# Patient Record
Sex: FEMALE | Race: WHITE | URBAN - METROPOLITAN AREA
[De-identification: names, ages, dates, MRNs, and addresses within clinical notes are randomized per-mention and may not be internally consistent; named-entity substitution may affect disease eponyms.]

---

## 2020-10-11 ENCOUNTER — TELEPHONE ENCOUNTER (OUTPATIENT)
Dept: URBAN - METROPOLITAN AREA CLINIC 92 | Facility: CLINIC | Age: 25
End: 2020-10-11

## 2020-10-11 RX ORDER — DIVALPROEX SODIUM 125 MG
TABLET, DELAYED RELEASE (ENTERIC COATED) ORAL
Qty: 0 | Refills: 0 | Status: ACTIVE | COMMUNITY
Start: 1900-01-01 | End: 1900-01-01

## 2020-10-11 RX ORDER — RUFINAMIDE 400 MG/1
TABLET, FILM COATED ORAL
Qty: 0 | Refills: 0 | Status: ACTIVE | COMMUNITY
Start: 1900-01-01 | End: 1900-01-01

## 2020-10-11 RX ORDER — CANNABIDIOL 100 MG/ML
SOLUTION ORAL
Qty: 0 | Refills: 0 | Status: ACTIVE | COMMUNITY
Start: 1900-01-01 | End: 1900-01-01

## 2020-10-11 RX ORDER — FAMOTIDINE 40 MG/5ML
5ML FOR SUSPENSION ORAL ONCE A DAY
Qty: 450 ML | Refills: 0
Start: 2020-10-11

## 2020-10-11 RX ORDER — LEVOCARNITINE 330 MG/1
TABLET ORAL
Qty: 0 | Refills: 0 | Status: ACTIVE | COMMUNITY
Start: 1900-01-01 | End: 1900-01-01

## 2020-10-11 RX ORDER — FOLIC ACID 1 MG/1
TABLET ORAL
Qty: 0 | Refills: 0 | Status: ACTIVE | COMMUNITY
Start: 1900-01-01 | End: 1900-01-01

## 2020-11-11 ENCOUNTER — OFFICE VISIT (OUTPATIENT)
Dept: URBAN - METROPOLITAN AREA TELEHEALTH 2 | Facility: TELEHEALTH | Age: 25
End: 2020-11-11
Payer: COMMERCIAL

## 2020-11-11 DIAGNOSIS — Z99.3 WHEELCHAIR BOUND: ICD-10-CM

## 2020-11-11 DIAGNOSIS — K21.9 ACID REFLUX DISEASE: ICD-10-CM

## 2020-11-11 DIAGNOSIS — G40.813 LENNOX-GASTAUT SYNDROME, INTRACTABLE, WITH STATUS EPILEPTICUS: ICD-10-CM

## 2020-11-11 DIAGNOSIS — H47.619 CORTICAL BLINDNESS, UNSPECIFIED SIDE OF BRAIN: ICD-10-CM

## 2020-11-11 DIAGNOSIS — K59.01 CONSTIPATION: ICD-10-CM

## 2020-11-11 PROCEDURE — G9903 PT SCRN TBCO ID AS NON USER: HCPCS | Performed by: INTERNAL MEDICINE

## 2020-11-11 PROCEDURE — G8427 DOCREV CUR MEDS BY ELIG CLIN: HCPCS | Performed by: INTERNAL MEDICINE

## 2020-11-11 PROCEDURE — 99212 OFFICE O/P EST SF 10 MIN: CPT | Performed by: INTERNAL MEDICINE

## 2020-11-11 PROCEDURE — 1036F TOBACCO NON-USER: CPT | Performed by: INTERNAL MEDICINE

## 2020-11-11 PROCEDURE — G8420 CALC BMI NORM PARAMETERS: HCPCS | Performed by: INTERNAL MEDICINE

## 2020-11-11 RX ORDER — DIVALPROEX SODIUM 125 MG
TABLET, DELAYED RELEASE (ENTERIC COATED) ORAL
Qty: 0 | Refills: 0 | Status: ACTIVE | COMMUNITY
Start: 1900-01-01 | End: 1900-01-01

## 2020-11-11 RX ORDER — FOLIC ACID 1 MG/1
TABLET ORAL
Qty: 0 | Refills: 0 | Status: ACTIVE | COMMUNITY
Start: 1900-01-01 | End: 1900-01-01

## 2020-11-11 RX ORDER — FAMOTIDINE 40 MG/5ML
5ML FOR SUSPENSION ORAL ONCE A DAY
Qty: 450 ML | Refills: 0 | Status: ACTIVE | COMMUNITY
Start: 2020-10-11

## 2020-11-11 RX ORDER — RUFINAMIDE 400 MG/1
TABLET, FILM COATED ORAL
Qty: 0 | Refills: 0 | Status: ACTIVE | COMMUNITY
Start: 1900-01-01 | End: 1900-01-01

## 2020-11-11 RX ORDER — FAMOTIDINE 40 MG/5ML
5ML FOR SUSPENSION ORAL ONCE A DAY
Qty: 450 ML | Refills: 1

## 2020-11-11 RX ORDER — LEVOCARNITINE 330 MG/1
TABLET ORAL
Qty: 0 | Refills: 0 | Status: ACTIVE | COMMUNITY
Start: 1900-01-01 | End: 1900-01-01

## 2020-11-11 RX ORDER — CANNABIDIOL 100 MG/ML
SOLUTION ORAL
Qty: 0 | Refills: 0 | Status: ACTIVE | COMMUNITY
Start: 1900-01-01 | End: 1900-01-01

## 2020-11-11 NOTE — HPI-TODAY'S VISIT:
-she has started a day program  -she is doing the miralax on a daily basis which is helping.  she has had a change in her seizure medication which may have changed this . she states that she is working on doing this -she has had a great appetite- not using cyprohepatidine at all  -weight igoing up -good appetite -she is using the famotidine  on a daily basis -she does not currently need refills

## 2020-11-11 NOTE — HPI-OTHER HISTORIES
Patient was seen last in 2019. She has history of cerebral palsy and Lennox gastaut syndrome with cortical blindness. She is wheelchair bound. She has a history of bowel obstruction in 2016 and since that time has been on a regimen of MiraLAX as needed. She had previously been on cyproheptadine to help with her appetite. But at her last visit she actually had stopped the medication. She is on multiple anti-seizure medications.

## 2021-01-14 ENCOUNTER — TELEPHONE ENCOUNTER (OUTPATIENT)
Dept: URBAN - METROPOLITAN AREA CLINIC 23 | Facility: CLINIC | Age: 26
End: 2021-01-14

## 2021-08-09 ENCOUNTER — TELEPHONE ENCOUNTER (OUTPATIENT)
Dept: URBAN - METROPOLITAN AREA CLINIC 49 | Facility: CLINIC | Age: 26
End: 2021-08-09

## 2021-08-09 RX ORDER — FAMOTIDINE 40 MG/5ML
5ML FOR SUSPENSION ORAL ONCE A DAY
Qty: 450 ML | Refills: 1
Start: 2021-08-09

## 2021-09-16 ENCOUNTER — OFFICE VISIT (OUTPATIENT)
Dept: URBAN - METROPOLITAN AREA CLINIC 23 | Facility: CLINIC | Age: 26
End: 2021-09-16

## 2021-10-25 ENCOUNTER — WEB ENCOUNTER (OUTPATIENT)
Dept: URBAN - METROPOLITAN AREA CLINIC 23 | Facility: CLINIC | Age: 26
End: 2021-10-25

## 2021-10-25 ENCOUNTER — OFFICE VISIT (OUTPATIENT)
Dept: URBAN - METROPOLITAN AREA CLINIC 23 | Facility: CLINIC | Age: 26
End: 2021-10-25
Payer: COMMERCIAL

## 2021-10-25 VITALS
BODY MASS INDEX: 21.52 KG/M2 | HEART RATE: 70 BPM | TEMPERATURE: 97.7 F | DIASTOLIC BLOOD PRESSURE: 64 MMHG | HEIGHT: 55 IN | WEIGHT: 93 LBS | SYSTOLIC BLOOD PRESSURE: 104 MMHG

## 2021-10-25 DIAGNOSIS — K59.01 CONSTIPATION: ICD-10-CM

## 2021-10-25 DIAGNOSIS — H47.619 CORTICAL BLINDNESS, UNSPECIFIED SIDE OF BRAIN: ICD-10-CM

## 2021-10-25 DIAGNOSIS — Z99.3 WHEELCHAIR BOUND: ICD-10-CM

## 2021-10-25 DIAGNOSIS — G40.813 LENNOX-GASTAUT SYNDROME, INTRACTABLE, WITH STATUS EPILEPTICUS: ICD-10-CM

## 2021-10-25 DIAGNOSIS — K21.9 ACID REFLUX DISEASE: ICD-10-CM

## 2021-10-25 PROCEDURE — 99213 OFFICE O/P EST LOW 20 MIN: CPT | Performed by: INTERNAL MEDICINE

## 2021-10-25 RX ORDER — FOLIC ACID 1 MG/1
TABLET ORAL
Qty: 0 | Refills: 0 | Status: ACTIVE | COMMUNITY
Start: 1900-01-01

## 2021-10-25 RX ORDER — FAMOTIDINE 40 MG/5ML
5ML FOR SUSPENSION ORAL ONCE A DAY
Qty: 450 ML | Refills: 1 | Status: ACTIVE | COMMUNITY
Start: 2021-08-09

## 2021-10-25 RX ORDER — RUFINAMIDE 400 MG/1
TABLET, FILM COATED ORAL
Qty: 0 | Refills: 0 | Status: ACTIVE | COMMUNITY
Start: 1900-01-01

## 2021-10-25 RX ORDER — CANNABIDIOL 100 MG/ML
SOLUTION ORAL
Qty: 0 | Refills: 0 | Status: ACTIVE | COMMUNITY
Start: 1900-01-01

## 2021-10-25 RX ORDER — PERAMPANEL 6 MG/1
1 TABLET AT BEDTIME TABLET ORAL ONCE A DAY
Status: ACTIVE | COMMUNITY

## 2021-10-25 RX ORDER — LEVOCARNITINE 330 MG/1
TABLET ORAL
Qty: 0 | Refills: 0 | Status: DISCONTINUED | COMMUNITY
Start: 1900-01-01

## 2021-10-25 RX ORDER — DIVALPROEX SODIUM 125 MG
TABLET, DELAYED RELEASE (ENTERIC COATED) ORAL
Qty: 0 | Refills: 0 | Status: DISCONTINUED | COMMUNITY
Start: 1900-01-01

## 2021-10-25 RX ORDER — FAMOTIDINE 40 MG/5ML
5ML FOR SUSPENSION ORAL ONCE A DAY
Qty: 450 ML | Refills: 1

## 2021-10-25 NOTE — HPI-OTHER HISTORIES
. She has history of cerebral palsy and Lennox gastaut syndrome with cortical blindness. She is wheelchair bound. She has a history of bowel obstruction in 2016 and since that time has been on a regimen of MiraLAX as needed. She had previously been on cyproheptadine to help with her appetite. But at her last visit she actually had stopped the medication. She is on multiple anti-seizure medications.

## 2021-10-25 NOTE — HPI-TODAY'S VISIT:
10/25/2021 her bm have been a little more regular- she has been getting mag, zinc, d3, and feels that this has helped -she has been taking this regularly -she hasn't been using the miralax as well -she has been using an aloe supplement to help with this  -her appetite has been doing ok, a little bit less lateley- she feels like she is getting adequate amount of calories.  her weight has been consistant.  -no issues with pain, nausea, vomiting -she is staying hydrated -weight  has

## 2022-04-06 ENCOUNTER — TELEPHONE ENCOUNTER (OUTPATIENT)
Dept: URBAN - METROPOLITAN AREA CLINIC 23 | Facility: CLINIC | Age: 27
End: 2022-04-06

## 2022-04-06 RX ORDER — FAMOTIDINE 40 MG/5ML
5ML FOR SUSPENSION ORAL ONCE A DAY
Qty: 450 ML | Refills: 1

## 2022-04-08 ENCOUNTER — TELEPHONE ENCOUNTER (OUTPATIENT)
Dept: URBAN - METROPOLITAN AREA CLINIC 23 | Facility: CLINIC | Age: 27
End: 2022-04-08

## 2022-04-08 RX ORDER — FAMOTIDINE 40 MG/5ML
5ML FOR SUSPENSION ORAL ONCE A DAY
Qty: 450 ML | Refills: 1

## 2022-11-17 ENCOUNTER — TELEPHONE ENCOUNTER (OUTPATIENT)
Dept: URBAN - METROPOLITAN AREA CLINIC 23 | Facility: CLINIC | Age: 27
End: 2022-11-17

## 2022-11-17 RX ORDER — FAMOTIDINE 40 MG/5ML
5ML FOR SUSPENSION ORAL ONCE A DAY
Qty: 450 ML | Refills: 1

## 2023-01-19 ENCOUNTER — OFFICE VISIT (OUTPATIENT)
Dept: URBAN - METROPOLITAN AREA CLINIC 23 | Facility: CLINIC | Age: 28
End: 2023-01-19

## 2023-03-02 ENCOUNTER — OFFICE VISIT (OUTPATIENT)
Dept: URBAN - METROPOLITAN AREA CLINIC 23 | Facility: CLINIC | Age: 28
End: 2023-03-02
Payer: COMMERCIAL

## 2023-03-02 VITALS
HEART RATE: 90 BPM | HEIGHT: 55 IN | TEMPERATURE: 98.1 F | DIASTOLIC BLOOD PRESSURE: 65 MMHG | SYSTOLIC BLOOD PRESSURE: 99 MMHG | BODY MASS INDEX: 21.52 KG/M2 | WEIGHT: 93 LBS

## 2023-03-02 DIAGNOSIS — Z99.3 WHEELCHAIR BOUND: ICD-10-CM

## 2023-03-02 DIAGNOSIS — K59.01 CONSTIPATION: ICD-10-CM

## 2023-03-02 DIAGNOSIS — G40.813 LENNOX-GASTAUT SYNDROME, INTRACTABLE, WITH STATUS EPILEPTICUS: ICD-10-CM

## 2023-03-02 DIAGNOSIS — K21.9 ACID REFLUX DISEASE: ICD-10-CM

## 2023-03-02 DIAGNOSIS — H47.619 CORTICAL BLINDNESS, UNSPECIFIED SIDE OF BRAIN: ICD-10-CM

## 2023-03-02 PROBLEM — 14760008 CONSTIPATION: Status: ACTIVE | Noted: 2020-11-11

## 2023-03-02 PROBLEM — 105503008 DEPENDENCE ON WHEELCHAIR: Status: ACTIVE | Noted: 2020-11-11

## 2023-03-02 PROBLEM — 68574006 CORTICAL BLINDNESS: Status: ACTIVE | Noted: 2020-11-11

## 2023-03-02 PROCEDURE — 99213 OFFICE O/P EST LOW 20 MIN: CPT | Performed by: INTERNAL MEDICINE

## 2023-03-02 RX ORDER — FAMOTIDINE 40 MG/5ML
5ML FOR SUSPENSION ORAL ONCE A DAY
Qty: 450 ML | Refills: 1 | Status: ACTIVE | COMMUNITY

## 2023-03-02 RX ORDER — FOLIC ACID 1 MG/1
TABLET ORAL
Qty: 0 | Refills: 0 | Status: ACTIVE | COMMUNITY
Start: 1900-01-01

## 2023-03-02 RX ORDER — RUFINAMIDE 400 MG/1
TABLET, FILM COATED ORAL
Qty: 0 | Refills: 0 | Status: ACTIVE | COMMUNITY
Start: 1900-01-01

## 2023-03-02 RX ORDER — FAMOTIDINE 40 MG/5ML
5ML FOR SUSPENSION ORAL ONCE A DAY
Qty: 450 ML | Refills: 3

## 2023-03-02 RX ORDER — PERAMPANEL 6 MG/1
1 TABLET AT BEDTIME TABLET ORAL ONCE A DAY
Status: ACTIVE | COMMUNITY

## 2023-03-02 RX ORDER — CANNABIDIOL 100 MG/ML
SOLUTION ORAL
Qty: 0 | Refills: 0 | Status: ACTIVE | COMMUNITY
Start: 1900-01-01

## 2023-03-02 NOTE — HPI-TODAY'S VISIT:
10/25/2021 her bm have been a little more regular- she has been getting mag, zinc, d3, and feels that this has helped -she has been taking this regularly -she hasn't been using the miralax as well -she has been using an aloe supplement to help with this  -her appetite has been doing ok, a little bit less lateley- she feels like she is getting adequate amount of calories.  her weight has been consistant.  -no issues with pain, nausea, vomiting -she is staying hydrated -weight  has ================================================================= 3/2/2023 -her mother gives history -she has beeovereall doing well -has some constipation- often related to eating less/less liquids.  however if she takes good food and liquid intkae she is fine.  she has to take her seizure mediation daily but has to take emergency meds more good.  her vns was increased and has been less. with the seizure meds she would have more constipation.  has mejia using breakthrough meds less -miralax helps when she takes it -she is taking famotidine daily -a few months ago she was having more reflux but feels that was in the midst of having more frequent seizures

## 2024-05-16 ENCOUNTER — DASHBOARD ENCOUNTERS (OUTPATIENT)
Age: 29
End: 2024-05-16

## 2024-05-16 ENCOUNTER — OFFICE VISIT (OUTPATIENT)
Dept: URBAN - METROPOLITAN AREA CLINIC 23 | Facility: CLINIC | Age: 29
End: 2024-05-16
Payer: COMMERCIAL

## 2024-05-16 VITALS
TEMPERATURE: 98.1 F | WEIGHT: 93 LBS | DIASTOLIC BLOOD PRESSURE: 58 MMHG | HEART RATE: 68 BPM | SYSTOLIC BLOOD PRESSURE: 85 MMHG | HEIGHT: 55 IN | BODY MASS INDEX: 21.52 KG/M2

## 2024-05-16 DIAGNOSIS — G40.813 LENNOX-GASTAUT SYNDROME, INTRACTABLE, WITH STATUS EPILEPTICUS: ICD-10-CM

## 2024-05-16 DIAGNOSIS — Z99.3 WHEELCHAIR BOUND: ICD-10-CM

## 2024-05-16 DIAGNOSIS — K59.01 CONSTIPATION: ICD-10-CM

## 2024-05-16 DIAGNOSIS — H47.619 CORTICAL BLINDNESS, UNSPECIFIED SIDE OF BRAIN: ICD-10-CM

## 2024-05-16 DIAGNOSIS — K21.9 ACID REFLUX DISEASE: ICD-10-CM

## 2024-05-16 PROCEDURE — 99213 OFFICE O/P EST LOW 20 MIN: CPT | Performed by: INTERNAL MEDICINE

## 2024-05-16 RX ORDER — RUFINAMIDE 400 MG/1
TABLET, FILM COATED ORAL
Qty: 0 | Refills: 0 | Status: ACTIVE | COMMUNITY
Start: 1900-01-01

## 2024-05-16 RX ORDER — FOLIC ACID 1 MG/1
TABLET ORAL
Qty: 0 | Refills: 0 | Status: ACTIVE | COMMUNITY
Start: 1900-01-01

## 2024-05-16 RX ORDER — CANNABIDIOL 100 MG/ML
SOLUTION ORAL
Qty: 0 | Refills: 0 | Status: ACTIVE | COMMUNITY
Start: 1900-01-01

## 2024-05-16 RX ORDER — FAMOTIDINE 40 MG/5ML
5ML FOR SUSPENSION ORAL ONCE A DAY
Qty: 450 ML | Refills: 3 | Status: ACTIVE | COMMUNITY

## 2024-05-16 RX ORDER — FAMOTIDINE 40 MG/5ML
5ML FOR SUSPENSION ORAL ONCE A DAY
Qty: 450 ML | Refills: 3

## 2025-05-15 ENCOUNTER — OFFICE VISIT (OUTPATIENT)
Dept: URBAN - METROPOLITAN AREA CLINIC 23 | Facility: CLINIC | Age: 30
End: 2025-05-15

## 2025-05-15 ENCOUNTER — LAB OUTSIDE AN ENCOUNTER (OUTPATIENT)
Dept: URBAN - METROPOLITAN AREA CLINIC 23 | Facility: CLINIC | Age: 30
End: 2025-05-15

## 2025-05-15 RX ORDER — ESOMEPRAZOLE MAGNESIUM 20 MG/1
1 PACKET 1/2 TO 1 HOUR BEFORE A MEAL MIXED WITH 15 ML OF WATER GRANULE, DELAYED RELEASE ORAL ONCE A DAY
Qty: 30 | Refills: 1 | OUTPATIENT
Start: 2025-05-15

## 2025-05-15 RX ORDER — FAMOTIDINE 40 MG/5ML
5ML FOR SUSPENSION ORAL ONCE A DAY
Qty: 450 ML | Refills: 3 | Status: ACTIVE | COMMUNITY

## 2025-05-15 RX ORDER — FOLIC ACID 1 MG/1
TABLET ORAL
Qty: 0 | Refills: 0 | Status: ACTIVE | COMMUNITY
Start: 1900-01-01

## 2025-05-15 RX ORDER — FAMOTIDINE 40 MG/5ML
5ML FOR SUSPENSION ORAL ONCE A DAY
Qty: 450 ML | Refills: 3
Start: 2025-05-15

## 2025-05-15 RX ORDER — RUFINAMIDE 400 MG/1
TABLET, FILM COATED ORAL
Qty: 0 | Refills: 0 | Status: ACTIVE | COMMUNITY
Start: 1900-01-01

## 2025-05-15 RX ORDER — CANNABIDIOL 100 MG/ML
SOLUTION ORAL
Qty: 0 | Refills: 0 | Status: ACTIVE | COMMUNITY
Start: 1900-01-01

## 2025-05-29 ENCOUNTER — LAB OUTSIDE AN ENCOUNTER (OUTPATIENT)
Dept: URBAN - METROPOLITAN AREA CLINIC 23 | Facility: CLINIC | Age: 30
End: 2025-05-29

## 2025-06-02 ENCOUNTER — OFFICE VISIT (OUTPATIENT)
Dept: URBAN - METROPOLITAN AREA CLINIC 23 | Facility: CLINIC | Age: 30
End: 2025-06-02
Payer: COMMERCIAL

## 2025-06-02 DIAGNOSIS — R63.0 POOR APPETITE: ICD-10-CM

## 2025-06-02 DIAGNOSIS — Z99.3 WHEELCHAIR BOUND: ICD-10-CM

## 2025-06-02 DIAGNOSIS — G40.813 LENNOX-GASTAUT SYNDROME, INTRACTABLE, WITH STATUS EPILEPTICUS: ICD-10-CM

## 2025-06-02 DIAGNOSIS — K59.01 CONSTIPATION: ICD-10-CM

## 2025-06-02 DIAGNOSIS — H47.619 CORTICAL BLINDNESS, UNSPECIFIED SIDE OF BRAIN: ICD-10-CM

## 2025-06-02 DIAGNOSIS — K21.9 ACID REFLUX DISEASE: ICD-10-CM

## 2025-06-02 PROCEDURE — 99213 OFFICE O/P EST LOW 20 MIN: CPT | Performed by: INTERNAL MEDICINE

## 2025-06-02 RX ORDER — FOLIC ACID 1 MG/1
TABLET ORAL
Qty: 0 | Refills: 0 | Status: ACTIVE | COMMUNITY
Start: 1900-01-01

## 2025-06-02 RX ORDER — ESOMEPRAZOLE MAGNESIUM 20 MG/1
1 PACKET 1/2 TO 1 HOUR BEFORE A MEAL MIXED WITH 15 ML OF WATER GRANULE, DELAYED RELEASE ORAL ONCE A DAY
Qty: 30 | Refills: 1 | OUTPATIENT
Start: 2025-06-02

## 2025-06-02 RX ORDER — CANNABIDIOL 100 MG/ML
SOLUTION ORAL
Qty: 0 | Refills: 0 | Status: ACTIVE | COMMUNITY
Start: 1900-01-01

## 2025-06-02 RX ORDER — ESOMEPRAZOLE MAGNESIUM 20 MG/1
1 PACKET 1/2 TO 1 HOUR BEFORE A MEAL MIXED WITH 15 ML OF WATER GRANULE, DELAYED RELEASE ORAL ONCE A DAY
Qty: 30 | Refills: 1 | Status: ACTIVE | COMMUNITY
Start: 2025-05-15

## 2025-06-02 RX ORDER — RUFINAMIDE 400 MG/1
TABLET, FILM COATED ORAL
Qty: 0 | Refills: 0 | Status: ACTIVE | COMMUNITY
Start: 1900-01-01

## 2025-06-02 RX ORDER — FAMOTIDINE 40 MG/5ML
5ML FOR SUSPENSION ORAL ONCE A DAY
Qty: 450 ML | Refills: 3
Start: 2025-06-02

## 2025-06-02 RX ORDER — FAMOTIDINE 40 MG/5ML
5ML FOR SUSPENSION ORAL ONCE A DAY
Qty: 450 ML | Refills: 3 | Status: ACTIVE | COMMUNITY
Start: 2025-05-15

## 2025-06-02 NOTE — HPI-TODAY'S VISIT:
mother gives history   Patient presented for follow-up regarding urinary retention and constipation issues. - Patient has been showing improvement with urinary function, no longer holding urine and demonstrating better bladder emptying. - Recent x-ray was reported as normal with no abnormalities identified. - Current bowel management includes alternating between Miralax (every other day) and "pooper drops" (an all-natural supplement, also given every other day). - Patient was previously on cyproheptadine which caused significant urinary retention problems, particularly during the most recent trial. This medication has been discontinued. - Patient reports feeling more comfortable with current regimen compared to previous visits. - Caregiver notes significant improvement in toileting habits - patient now has normal urination patterns without prolonged waiting times. - No reports of abdominal discomfort or pain. - Patient is tolerating food and feedings well. - Caregiver acknowledges not yet implementing the recommended fiber supplement due to recent household disruptions but plans to start soon. - Patient continues on reflux medication with refills recently provided.

## 2025-07-01 ENCOUNTER — WEB ENCOUNTER (OUTPATIENT)
Dept: URBAN - METROPOLITAN AREA CLINIC 12 | Facility: CLINIC | Age: 30
End: 2025-07-01

## 2025-07-01 RX ORDER — FAMOTIDINE 40 MG/5ML
5ML FOR SUSPENSION ORAL ONCE A DAY
Qty: 450 | Refills: 1
Start: 2025-06-02